# Patient Record
Sex: FEMALE
[De-identification: names, ages, dates, MRNs, and addresses within clinical notes are randomized per-mention and may not be internally consistent; named-entity substitution may affect disease eponyms.]

---

## 2018-10-09 PROBLEM — Z00.00 ENCOUNTER FOR PREVENTIVE HEALTH EXAMINATION: Status: ACTIVE | Noted: 2018-10-09

## 2018-10-16 ENCOUNTER — APPOINTMENT (OUTPATIENT)
Dept: OPHTHALMOLOGY | Facility: CLINIC | Age: 58
End: 2018-10-16
Payer: COMMERCIAL

## 2018-10-16 PROCEDURE — 92134 CPTRZ OPH DX IMG PST SGM RTA: CPT

## 2018-10-16 PROCEDURE — 92004 COMPRE OPH EXAM NEW PT 1/>: CPT

## 2018-11-20 ENCOUNTER — APPOINTMENT (OUTPATIENT)
Dept: OPHTHALMOLOGY | Facility: CLINIC | Age: 58
End: 2018-11-20
Payer: COMMERCIAL

## 2018-11-20 DIAGNOSIS — H20.9 UNSPECIFIED IRIDOCYCLITIS: ICD-10-CM

## 2018-11-20 DIAGNOSIS — H40.059 OCULAR HYPERTENSION, UNSPECIFIED EYE: ICD-10-CM

## 2018-11-20 DIAGNOSIS — H35.363 DRUSEN (DEGENERATIVE) OF MACULA, BILATERAL: ICD-10-CM

## 2018-11-20 PROCEDURE — 92225: CPT | Mod: LT

## 2018-11-20 PROCEDURE — 92133 CPTRZD OPH DX IMG PST SGM ON: CPT

## 2018-11-20 PROCEDURE — 92012 INTRM OPH EXAM EST PATIENT: CPT

## 2019-10-23 ENCOUNTER — APPOINTMENT (OUTPATIENT)
Dept: OPHTHALMOLOGY | Facility: CLINIC | Age: 59
End: 2019-10-23
Payer: COMMERCIAL

## 2019-10-23 ENCOUNTER — NON-APPOINTMENT (OUTPATIENT)
Age: 59
End: 2019-10-23

## 2019-10-23 PROCEDURE — 92012 INTRM OPH EXAM EST PATIENT: CPT

## 2019-10-23 PROCEDURE — 92134 CPTRZ OPH DX IMG PST SGM RTA: CPT

## 2020-11-12 ENCOUNTER — APPOINTMENT (OUTPATIENT)
Dept: OPHTHALMOLOGY | Facility: CLINIC | Age: 60
End: 2020-11-12
Payer: COMMERCIAL

## 2020-11-12 ENCOUNTER — NON-APPOINTMENT (OUTPATIENT)
Age: 60
End: 2020-11-12

## 2020-11-12 PROCEDURE — 92012 INTRM OPH EXAM EST PATIENT: CPT

## 2020-11-12 PROCEDURE — 92134 CPTRZ OPH DX IMG PST SGM RTA: CPT

## 2020-11-12 PROCEDURE — 99072 ADDL SUPL MATRL&STAF TM PHE: CPT

## 2022-07-26 ENCOUNTER — APPOINTMENT (OUTPATIENT)
Dept: PODIATRY | Facility: CLINIC | Age: 62
End: 2022-07-26

## 2022-07-26 VITALS — BODY MASS INDEX: 19.46 KG/M2 | WEIGHT: 114 LBS | HEIGHT: 64 IN

## 2022-07-26 DIAGNOSIS — Z78.9 OTHER SPECIFIED HEALTH STATUS: ICD-10-CM

## 2022-07-26 DIAGNOSIS — M20.11 HALLUX VALGUS (ACQUIRED), RIGHT FOOT: ICD-10-CM

## 2022-07-26 DIAGNOSIS — M20.12 HALLUX VALGUS (ACQUIRED), RIGHT FOOT: ICD-10-CM

## 2022-07-26 PROCEDURE — 99203 OFFICE O/P NEW LOW 30 MIN: CPT

## 2022-11-02 ENCOUNTER — NON-APPOINTMENT (OUTPATIENT)
Age: 62
End: 2022-11-02